# Patient Record
Sex: FEMALE | Race: AMERICAN INDIAN OR ALASKA NATIVE | NOT HISPANIC OR LATINO
[De-identification: names, ages, dates, MRNs, and addresses within clinical notes are randomized per-mention and may not be internally consistent; named-entity substitution may affect disease eponyms.]

---

## 2017-10-13 ENCOUNTER — APPOINTMENT (OUTPATIENT)
Dept: OBGYN | Facility: CLINIC | Age: 42
End: 2017-10-13
Payer: COMMERCIAL

## 2017-10-13 DIAGNOSIS — R39.15 URGENCY OF URINATION: ICD-10-CM

## 2017-10-13 LAB
BILIRUB UR QL STRIP: NEGATIVE
CLARITY UR: CLEAR
COLLECTION METHOD: NORMAL
GLUCOSE UR-MCNC: NEGATIVE
HCG UR QL: 0.2 EU/DL
HGB UR QL STRIP.AUTO: NORMAL
KETONES UR-MCNC: NEGATIVE
LEUKOCYTE ESTERASE UR QL STRIP: NEGATIVE
NITRITE UR QL STRIP: NEGATIVE
PH UR STRIP: 6.5
PROT UR STRIP-MCNC: NEGATIVE
SP GR UR STRIP: 1.01

## 2017-10-13 PROCEDURE — 99211 OFF/OP EST MAY X REQ PHY/QHP: CPT

## 2017-10-16 LAB — BACTERIA UR CULT: ABNORMAL

## 2017-12-01 ENCOUNTER — APPOINTMENT (OUTPATIENT)
Dept: OBGYN | Facility: CLINIC | Age: 42
End: 2017-12-01

## 2018-06-06 ENCOUNTER — APPOINTMENT (OUTPATIENT)
Dept: OBGYN | Facility: CLINIC | Age: 43
End: 2018-06-06
Payer: COMMERCIAL

## 2018-06-06 VITALS
HEIGHT: 64 IN | BODY MASS INDEX: 22.53 KG/M2 | HEART RATE: 78 BPM | SYSTOLIC BLOOD PRESSURE: 124 MMHG | DIASTOLIC BLOOD PRESSURE: 79 MMHG | WEIGHT: 132 LBS

## 2018-06-06 PROCEDURE — 99396 PREV VISIT EST AGE 40-64: CPT

## 2018-06-06 RX ORDER — OSELTAMIVIR PHOSPHATE 75 MG/1
75 CAPSULE ORAL
Qty: 10 | Refills: 0 | Status: DISCONTINUED | COMMUNITY
Start: 2018-01-22

## 2018-06-06 RX ORDER — METHYLPREDNISOLONE 4 MG/1
4 TABLET ORAL
Qty: 21 | Refills: 0 | Status: DISCONTINUED | COMMUNITY
Start: 2017-12-29

## 2018-06-08 ENCOUNTER — OTHER (OUTPATIENT)
Age: 43
End: 2018-06-08

## 2018-06-11 LAB
CYTOLOGY CVX/VAG DOC THIN PREP: NORMAL
HPV HIGH+LOW RISK DNA PNL CVX: NOT DETECTED

## 2018-06-20 ENCOUNTER — APPOINTMENT (OUTPATIENT)
Dept: MAMMOGRAPHY | Facility: IMAGING CENTER | Age: 43
End: 2018-06-20
Payer: COMMERCIAL

## 2018-06-20 ENCOUNTER — OUTPATIENT (OUTPATIENT)
Dept: OUTPATIENT SERVICES | Facility: HOSPITAL | Age: 43
LOS: 1 days | End: 2018-06-20
Payer: COMMERCIAL

## 2018-06-20 DIAGNOSIS — Z01.419 ENCOUNTER FOR GYNECOLOGICAL EXAMINATION (GENERAL) (ROUTINE) WITHOUT ABNORMAL FINDINGS: ICD-10-CM

## 2018-06-20 PROCEDURE — 77067 SCR MAMMO BI INCL CAD: CPT

## 2018-06-20 PROCEDURE — 77063 BREAST TOMOSYNTHESIS BI: CPT

## 2018-06-20 PROCEDURE — 77067 SCR MAMMO BI INCL CAD: CPT | Mod: 26

## 2018-06-20 PROCEDURE — 77063 BREAST TOMOSYNTHESIS BI: CPT | Mod: 26

## 2018-07-05 ENCOUNTER — APPOINTMENT (OUTPATIENT)
Dept: MAMMOGRAPHY | Facility: IMAGING CENTER | Age: 43
End: 2018-07-05
Payer: COMMERCIAL

## 2018-07-05 ENCOUNTER — OUTPATIENT (OUTPATIENT)
Dept: OUTPATIENT SERVICES | Facility: HOSPITAL | Age: 43
LOS: 1 days | End: 2018-07-05
Payer: COMMERCIAL

## 2018-07-05 ENCOUNTER — APPOINTMENT (OUTPATIENT)
Dept: ULTRASOUND IMAGING | Facility: IMAGING CENTER | Age: 43
End: 2018-07-05
Payer: COMMERCIAL

## 2018-07-05 DIAGNOSIS — R92.2 INCONCLUSIVE MAMMOGRAM: ICD-10-CM

## 2018-07-05 PROCEDURE — 76642 ULTRASOUND BREAST LIMITED: CPT

## 2018-07-05 PROCEDURE — G0279: CPT

## 2018-07-05 PROCEDURE — G0279: CPT | Mod: 26

## 2018-07-05 PROCEDURE — 77061 BREAST TOMOSYNTHESIS UNI: CPT | Mod: 26,RT

## 2018-07-05 PROCEDURE — 77065 DX MAMMO INCL CAD UNI: CPT

## 2018-07-05 PROCEDURE — 76642 ULTRASOUND BREAST LIMITED: CPT | Mod: 26,RT

## 2018-07-05 PROCEDURE — 77065 DX MAMMO INCL CAD UNI: CPT | Mod: 26

## 2018-07-05 PROCEDURE — 77065 DX MAMMO INCL CAD UNI: CPT | Mod: 26,RT

## 2018-10-04 ENCOUNTER — RESULT CHARGE (OUTPATIENT)
Age: 43
End: 2018-10-04

## 2018-10-04 ENCOUNTER — APPOINTMENT (OUTPATIENT)
Dept: OBGYN | Facility: CLINIC | Age: 43
End: 2018-10-04
Payer: COMMERCIAL

## 2018-10-04 ENCOUNTER — OTHER (OUTPATIENT)
Age: 43
End: 2018-10-04

## 2018-10-04 VITALS
SYSTOLIC BLOOD PRESSURE: 128 MMHG | HEART RATE: 75 BPM | WEIGHT: 133 LBS | DIASTOLIC BLOOD PRESSURE: 79 MMHG | BODY MASS INDEX: 22.71 KG/M2 | HEIGHT: 64 IN

## 2018-10-04 DIAGNOSIS — N92.6 IRREGULAR MENSTRUATION, UNSPECIFIED: ICD-10-CM

## 2018-10-04 DIAGNOSIS — N89.8 OTHER SPECIFIED NONINFLAMMATORY DISORDERS OF VAGINA: ICD-10-CM

## 2018-10-04 DIAGNOSIS — Z87.42 PERSONAL HISTORY OF OTHER DISEASES OF THE FEMALE GENITAL TRACT: ICD-10-CM

## 2018-10-04 PROCEDURE — 99214 OFFICE O/P EST MOD 30 MIN: CPT

## 2018-10-04 RX ORDER — CIPROFLOXACIN HYDROCHLORIDE 500 MG/1
500 TABLET, FILM COATED ORAL
Qty: 6 | Refills: 0 | Status: DISCONTINUED | COMMUNITY
Start: 2017-10-13 | End: 2018-10-04

## 2018-10-04 RX ORDER — AMOXICILLIN AND CLAVULANATE POTASSIUM 875; 125 MG/1; MG/1
875-125 TABLET, COATED ORAL
Qty: 14 | Refills: 0 | Status: DISCONTINUED | COMMUNITY
Start: 2017-12-29 | End: 2018-10-04

## 2018-10-08 ENCOUNTER — OTHER (OUTPATIENT)
Age: 43
End: 2018-10-08

## 2018-10-08 LAB
BACTERIA UR CULT: ABNORMAL
CANDIDA VAG CYTO: DETECTED
ESTRADIOL SERPL-MCNC: 75 PG/ML
FSH SERPL-MCNC: 13.6 IU/L
G VAGINALIS+PREV SP MTYP VAG QL MICRO: NOT DETECTED
HCG SERPL-MCNC: <1 MIU/ML
PROLACTIN SERPL-MCNC: 11.6 NG/ML
T VAGINALIS VAG QL WET PREP: NOT DETECTED
T4 FREE SERPL-MCNC: 1.4 NG/DL
TSH SERPL-ACNC: 1.91 UIU/ML

## 2018-10-09 ENCOUNTER — OTHER (OUTPATIENT)
Age: 43
End: 2018-10-09

## 2019-05-31 ENCOUNTER — APPOINTMENT (OUTPATIENT)
Dept: ULTRASOUND IMAGING | Facility: CLINIC | Age: 44
End: 2019-05-31
Payer: COMMERCIAL

## 2019-05-31 ENCOUNTER — OUTPATIENT (OUTPATIENT)
Dept: OUTPATIENT SERVICES | Facility: HOSPITAL | Age: 44
LOS: 1 days | End: 2019-05-31
Payer: COMMERCIAL

## 2019-05-31 DIAGNOSIS — Z00.8 ENCOUNTER FOR OTHER GENERAL EXAMINATION: ICD-10-CM

## 2019-05-31 PROCEDURE — 76770 US EXAM ABDO BACK WALL COMP: CPT

## 2019-05-31 PROCEDURE — 76770 US EXAM ABDO BACK WALL COMP: CPT | Mod: 26

## 2019-07-15 ENCOUNTER — APPOINTMENT (OUTPATIENT)
Dept: OBGYN | Facility: CLINIC | Age: 44
End: 2019-07-15
Payer: COMMERCIAL

## 2019-07-15 VITALS
HEIGHT: 64 IN | HEART RATE: 68 BPM | BODY MASS INDEX: 22.55 KG/M2 | WEIGHT: 132.1 LBS | DIASTOLIC BLOOD PRESSURE: 70 MMHG | SYSTOLIC BLOOD PRESSURE: 109 MMHG

## 2019-07-15 DIAGNOSIS — H91.90 UNSPECIFIED HEARING LOSS, UNSPECIFIED EAR: ICD-10-CM

## 2019-07-15 DIAGNOSIS — D32.0 BENIGN NEOPLASM OF CEREBRAL MENINGES: ICD-10-CM

## 2019-07-15 DIAGNOSIS — Z01.419 ENCOUNTER FOR GYNECOLOGICAL EXAMINATION (GENERAL) (ROUTINE) W/OUT ABNORMAL FINDINGS: ICD-10-CM

## 2019-07-15 PROCEDURE — 99396 PREV VISIT EST AGE 40-64: CPT

## 2019-07-15 NOTE — CHIEF COMPLAINT
[Annual Visit] : annual visit [FreeTextEntry1] : 43 y.o. P 3003  LMP 7/9/19 for annual exam. pt feels well. pt has hx of meningioma, s/p craniotomy , followed by Gamma knife radiation at [facility]. .

## 2019-07-15 NOTE — PHYSICAL EXAM
[Awake] : awake [Alert] : alert [Acute Distress] : no acute distress [Mass] : no breast mass [Nipple Discharge] : no nipple discharge [Axillary LAD] : no axillary lymphadenopathy [Soft] : soft [Tender] : non tender [Oriented x3] : oriented to person, place, and time [Normal] : uterus [No Bleeding] : there was no active vaginal bleeding [Normal Position] : in a normal position [Uterine Adnexae] : were not tender and not enlarged

## 2019-07-31 ENCOUNTER — APPOINTMENT (OUTPATIENT)
Age: 44
End: 2019-07-31

## 2019-10-17 ENCOUNTER — APPOINTMENT (OUTPATIENT)
Dept: MAMMOGRAPHY | Facility: IMAGING CENTER | Age: 44
End: 2019-10-17

## 2019-10-17 ENCOUNTER — APPOINTMENT (OUTPATIENT)
Dept: ULTRASOUND IMAGING | Facility: IMAGING CENTER | Age: 44
End: 2019-10-17

## 2019-11-20 ENCOUNTER — FORM ENCOUNTER (OUTPATIENT)
Age: 44
End: 2019-11-20

## 2019-11-21 ENCOUNTER — APPOINTMENT (OUTPATIENT)
Dept: ULTRASOUND IMAGING | Facility: IMAGING CENTER | Age: 44
End: 2019-11-21
Payer: COMMERCIAL

## 2019-11-21 ENCOUNTER — APPOINTMENT (OUTPATIENT)
Dept: MAMMOGRAPHY | Facility: IMAGING CENTER | Age: 44
End: 2019-11-21
Payer: COMMERCIAL

## 2019-11-21 ENCOUNTER — OUTPATIENT (OUTPATIENT)
Dept: OUTPATIENT SERVICES | Facility: HOSPITAL | Age: 44
LOS: 1 days | End: 2019-11-21
Payer: COMMERCIAL

## 2019-11-21 DIAGNOSIS — Z00.8 ENCOUNTER FOR OTHER GENERAL EXAMINATION: ICD-10-CM

## 2019-11-21 PROCEDURE — 77067 SCR MAMMO BI INCL CAD: CPT | Mod: 26

## 2019-11-21 PROCEDURE — 76641 ULTRASOUND BREAST COMPLETE: CPT | Mod: 26,50

## 2019-11-21 PROCEDURE — 77063 BREAST TOMOSYNTHESIS BI: CPT | Mod: 26

## 2019-11-21 PROCEDURE — 77067 SCR MAMMO BI INCL CAD: CPT

## 2019-11-21 PROCEDURE — 76641 ULTRASOUND BREAST COMPLETE: CPT

## 2019-11-21 PROCEDURE — 77063 BREAST TOMOSYNTHESIS BI: CPT

## 2020-04-25 ENCOUNTER — MESSAGE (OUTPATIENT)
Age: 45
End: 2020-04-25

## 2020-07-01 LAB
SARS-COV-2 IGG SERPL IA-ACNC: <3.8 AU/ML
SARS-COV-2 IGG SERPL QL IA: NEGATIVE

## 2020-11-05 ENCOUNTER — APPOINTMENT (OUTPATIENT)
Dept: OBGYN | Facility: CLINIC | Age: 45
End: 2020-11-05
Payer: COMMERCIAL

## 2020-11-05 VITALS
SYSTOLIC BLOOD PRESSURE: 119 MMHG | HEIGHT: 64 IN | WEIGHT: 132 LBS | BODY MASS INDEX: 22.53 KG/M2 | TEMPERATURE: 98.9 F | HEART RATE: 67 BPM | DIASTOLIC BLOOD PRESSURE: 82 MMHG

## 2020-11-05 DIAGNOSIS — R39.11 HESITANCY OF MICTURITION: ICD-10-CM

## 2020-11-05 PROBLEM — N92.6 IRREGULAR MENSTRUAL CYCLE: Status: ACTIVE | Noted: 2020-11-05

## 2020-11-05 PROCEDURE — 99072 ADDL SUPL MATRL&STAF TM PHE: CPT

## 2020-11-05 PROCEDURE — 99214 OFFICE O/P EST MOD 30 MIN: CPT

## 2020-11-05 RX ORDER — FLUCONAZOLE 150 MG/1
150 TABLET ORAL
Qty: 2 | Refills: 1 | Status: DISCONTINUED | COMMUNITY
Start: 2018-10-08 | End: 2020-11-05

## 2020-11-05 RX ORDER — AMPICILLIN 500 MG/1
500 CAPSULE ORAL EVERY 8 HOURS
Qty: 21 | Refills: 0 | Status: DISCONTINUED | COMMUNITY
Start: 2018-10-08 | End: 2020-11-05

## 2020-11-05 NOTE — PHYSICAL EXAM
[Labia Majora] : normal [Labia Minora] : normal [Moderate] : There was moderate vaginal bleeding [Normal] : normal [Tenderness] : nontender [Uterine Adnexae] : non-palpable

## 2020-11-09 ENCOUNTER — NON-APPOINTMENT (OUTPATIENT)
Age: 45
End: 2020-11-09

## 2020-11-10 ENCOUNTER — APPOINTMENT (OUTPATIENT)
Dept: OBGYN | Facility: CLINIC | Age: 45
End: 2020-11-10
Payer: COMMERCIAL

## 2020-11-10 ENCOUNTER — ASOB RESULT (OUTPATIENT)
Age: 45
End: 2020-11-10

## 2020-11-10 VITALS
HEIGHT: 64 IN | TEMPERATURE: 98.4 F | WEIGHT: 135 LBS | SYSTOLIC BLOOD PRESSURE: 111 MMHG | DIASTOLIC BLOOD PRESSURE: 76 MMHG | HEART RATE: 73 BPM | BODY MASS INDEX: 23.05 KG/M2

## 2020-11-10 DIAGNOSIS — Z87.440 PERSONAL HISTORY OF URINARY (TRACT) INFECTIONS: ICD-10-CM

## 2020-11-10 LAB
BACTERIA UR CULT: ABNORMAL
C TRACH RRNA SPEC QL NAA+PROBE: NOT DETECTED
CANDIDA VAG CYTO: NOT DETECTED
ESTRADIOL SERPL-MCNC: 40 PG/ML
FSH SERPL-MCNC: 27.1 IU/L
G VAGINALIS+PREV SP MTYP VAG QL MICRO: NOT DETECTED
HCG SERPL-MCNC: <1 MIU/ML
N GONORRHOEA RRNA SPEC QL NAA+PROBE: NOT DETECTED
PROLACTIN SERPL-MCNC: 6.2 NG/ML
SOURCE AMPLIFICATION: NORMAL
T VAGINALIS VAG QL WET PREP: NOT DETECTED
TSH SERPL-ACNC: 1.46 UIU/ML

## 2020-11-10 PROCEDURE — 99072 ADDL SUPL MATRL&STAF TM PHE: CPT

## 2020-11-10 PROCEDURE — 58100 BIOPSY OF UTERUS LINING: CPT

## 2020-11-10 PROCEDURE — 76830 TRANSVAGINAL US NON-OB: CPT

## 2020-11-10 NOTE — PROCEDURE
[Endometrial Biopsy] : Endometrial biopsy [Time out performed] : Pre-procedure time out performed.  Patient's name, date of birth and procedure confirmed. [Consent Obtained] : Consent obtained [Irregular Bleeding] : irregular uterine bleeding [Risks] : risks [Benefits] : benefits [Patient] : patient [Infection] : infection [Bleeding] : bleeding [Uterine Perforation] : uterine perforation [Negative] : negative pregnancy test [Betadine] : Betadine [None] : none [Tenaculum] : Tenaculum [Easy Passage] : Easy passage [Anteverted] : anteverted [Moderate] : moderate [Tolerated Well] : Patient tolerated the procedure well [No Complications] : No complications

## 2020-11-18 ENCOUNTER — NON-APPOINTMENT (OUTPATIENT)
Age: 45
End: 2020-11-18

## 2020-11-18 LAB — CORE LAB BIOPSY: NORMAL

## 2020-12-01 ENCOUNTER — APPOINTMENT (OUTPATIENT)
Dept: OBGYN | Facility: CLINIC | Age: 45
End: 2020-12-01
Payer: COMMERCIAL

## 2020-12-01 VITALS
HEIGHT: 64 IN | HEART RATE: 81 BPM | DIASTOLIC BLOOD PRESSURE: 88 MMHG | BODY MASS INDEX: 23.05 KG/M2 | SYSTOLIC BLOOD PRESSURE: 129 MMHG | WEIGHT: 135 LBS

## 2020-12-01 DIAGNOSIS — Z00.00 ENCOUNTER FOR GENERAL ADULT MEDICAL EXAMINATION W/OUT ABNORMAL FINDINGS: ICD-10-CM

## 2020-12-01 PROCEDURE — 99213 OFFICE O/P EST LOW 20 MIN: CPT

## 2020-12-01 PROCEDURE — 99072 ADDL SUPL MATRL&STAF TM PHE: CPT

## 2020-12-01 NOTE — DISCUSSION/SUMMARY
[FreeTextEntry1] : A - r/o female climacteric state ( oligomenorrhea)\par       s/p UTI \par \par P - referral for mammogram given\par       FSH, LH, done\par       Urine C&S done\par      pap next due in 2021\par     exercise encouraged\par      nutritional counseling  provided. \par       f/u 1 year

## 2020-12-01 NOTE — HISTORY OF PRESENT ILLNESS
[FreeTextEntry1] : pt presents for follow up. LNMP 11/2/20.  summer 2020 . pt denies VMS. pt saw Katlin Dockery for for c/o urinary sxs and AUB, in early November, pt had >10,5 K.pneumoniae . pt was treated with Macrobid x 7 days. pt had bloodwork ,drawn for evaluation of AUB along with EMB which was benign , and her FSH was 27. \par today, will repeat FSH, along with LH and repeat Urine C&S post treatment for bacteriuria . next pap is due in 2021. pt is due for mammography.

## 2020-12-02 LAB
FSH SERPL-MCNC: 6.2 IU/L
LH SERPL-ACNC: 8.1 IU/L

## 2020-12-04 ENCOUNTER — NON-APPOINTMENT (OUTPATIENT)
Age: 45
End: 2020-12-04

## 2020-12-04 LAB — BACTERIA UR CULT: ABNORMAL

## 2020-12-23 PROBLEM — Z87.440 HISTORY OF URINARY TRACT INFECTION: Status: RESOLVED | Noted: 2020-11-10 | Resolved: 2020-12-23

## 2021-01-07 ENCOUNTER — OUTPATIENT (OUTPATIENT)
Dept: OUTPATIENT SERVICES | Facility: HOSPITAL | Age: 46
LOS: 1 days | End: 2021-01-07
Payer: COMMERCIAL

## 2021-01-07 ENCOUNTER — APPOINTMENT (OUTPATIENT)
Dept: RADIOLOGY | Facility: CLINIC | Age: 46
End: 2021-01-07
Payer: COMMERCIAL

## 2021-01-07 ENCOUNTER — APPOINTMENT (OUTPATIENT)
Dept: ULTRASOUND IMAGING | Facility: CLINIC | Age: 46
End: 2021-01-07
Payer: COMMERCIAL

## 2021-01-07 DIAGNOSIS — Z00.8 ENCOUNTER FOR OTHER GENERAL EXAMINATION: ICD-10-CM

## 2021-01-07 PROCEDURE — 72110 X-RAY EXAM L-2 SPINE 4/>VWS: CPT | Mod: 26

## 2021-01-07 PROCEDURE — 76775 US EXAM ABDO BACK WALL LIM: CPT

## 2021-01-07 PROCEDURE — 72110 X-RAY EXAM L-2 SPINE 4/>VWS: CPT

## 2021-01-07 PROCEDURE — 76775 US EXAM ABDO BACK WALL LIM: CPT | Mod: 26

## 2021-02-18 ENCOUNTER — APPOINTMENT (OUTPATIENT)
Dept: ULTRASOUND IMAGING | Facility: IMAGING CENTER | Age: 46
End: 2021-02-18
Payer: COMMERCIAL

## 2021-02-18 ENCOUNTER — APPOINTMENT (OUTPATIENT)
Dept: MAMMOGRAPHY | Facility: IMAGING CENTER | Age: 46
End: 2021-02-18
Payer: COMMERCIAL

## 2021-02-18 ENCOUNTER — OUTPATIENT (OUTPATIENT)
Dept: OUTPATIENT SERVICES | Facility: HOSPITAL | Age: 46
LOS: 1 days | End: 2021-02-18
Payer: COMMERCIAL

## 2021-02-18 ENCOUNTER — RESULT REVIEW (OUTPATIENT)
Age: 46
End: 2021-02-18

## 2021-02-18 DIAGNOSIS — Z00.8 ENCOUNTER FOR OTHER GENERAL EXAMINATION: ICD-10-CM

## 2021-02-18 PROCEDURE — 77063 BREAST TOMOSYNTHESIS BI: CPT | Mod: 26

## 2021-02-18 PROCEDURE — 76641 ULTRASOUND BREAST COMPLETE: CPT | Mod: 26,50

## 2021-02-18 PROCEDURE — 77067 SCR MAMMO BI INCL CAD: CPT

## 2021-02-18 PROCEDURE — 77063 BREAST TOMOSYNTHESIS BI: CPT

## 2021-02-18 PROCEDURE — 77067 SCR MAMMO BI INCL CAD: CPT | Mod: 26

## 2021-02-18 PROCEDURE — 76641 ULTRASOUND BREAST COMPLETE: CPT

## 2021-04-19 ENCOUNTER — NON-APPOINTMENT (OUTPATIENT)
Age: 46
End: 2021-04-19

## 2021-04-20 ENCOUNTER — RESULT REVIEW (OUTPATIENT)
Age: 46
End: 2021-04-20

## 2021-04-20 ENCOUNTER — APPOINTMENT (OUTPATIENT)
Dept: OBGYN | Facility: CLINIC | Age: 46
End: 2021-04-20
Payer: COMMERCIAL

## 2021-04-20 VITALS
DIASTOLIC BLOOD PRESSURE: 93 MMHG | BODY MASS INDEX: 23.05 KG/M2 | HEIGHT: 64 IN | HEART RATE: 74 BPM | SYSTOLIC BLOOD PRESSURE: 139 MMHG | WEIGHT: 135 LBS

## 2021-04-20 DIAGNOSIS — N91.5 OLIGOMENORRHEA, UNSPECIFIED: ICD-10-CM

## 2021-04-20 DIAGNOSIS — N63.20 UNSPECIFIED LUMP IN THE LEFT BREAST, UNSPECIFIED QUADRANT: ICD-10-CM

## 2021-04-20 PROCEDURE — 99213 OFFICE O/P EST LOW 20 MIN: CPT

## 2021-04-20 PROCEDURE — 99072 ADDL SUPL MATRL&STAF TM PHE: CPT

## 2021-04-20 NOTE — DISCUSSION/SUMMARY
[FreeTextEntry1] : A- left breast cyst\par      oligomenorrhea\par \par P- f/u with Radiology for aspiration of left breast cyst\par      FSH, LH,TSH done\par     pt to call for results.

## 2021-04-20 NOTE — HISTORY OF PRESENT ILLNESS
[FreeTextEntry1] : pt presents for follow up, LMP, Feb. 2, 2021, today, is concerned over 'lump' in left breast. first noticed Sunday night, ( 2 days ago. )  pt had birads-2 breast imaging in Feb. 2021, ( mammo and sono).

## 2021-04-22 LAB
FSH SERPL-MCNC: 70.8 IU/L
LH SERPL-ACNC: 39.5 IU/L
TSH SERPL-ACNC: 2.23 UIU/ML

## 2021-04-29 ENCOUNTER — OUTPATIENT (OUTPATIENT)
Dept: OUTPATIENT SERVICES | Facility: HOSPITAL | Age: 46
LOS: 1 days | End: 2021-04-29
Payer: COMMERCIAL

## 2021-04-29 ENCOUNTER — RESULT REVIEW (OUTPATIENT)
Age: 46
End: 2021-04-29

## 2021-04-29 ENCOUNTER — APPOINTMENT (OUTPATIENT)
Dept: ULTRASOUND IMAGING | Facility: IMAGING CENTER | Age: 46
End: 2021-04-29
Payer: COMMERCIAL

## 2021-04-29 DIAGNOSIS — Z00.8 ENCOUNTER FOR OTHER GENERAL EXAMINATION: ICD-10-CM

## 2021-04-29 DIAGNOSIS — N63.20 UNSPECIFIED LUMP IN THE LEFT BREAST, UNSPECIFIED QUADRANT: ICD-10-CM

## 2021-04-29 PROCEDURE — 19000 PUNCTURE ASPIR CYST BREAST: CPT

## 2021-04-29 PROCEDURE — 88173 CYTOPATH EVAL FNA REPORT: CPT | Mod: 26

## 2021-04-29 PROCEDURE — 88173 CYTOPATH EVAL FNA REPORT: CPT

## 2021-04-29 PROCEDURE — 76942 ECHO GUIDE FOR BIOPSY: CPT | Mod: 26

## 2021-04-29 PROCEDURE — 19000 PUNCTURE ASPIR CYST BREAST: CPT | Mod: LT

## 2021-04-29 PROCEDURE — 76942 ECHO GUIDE FOR BIOPSY: CPT

## 2021-04-30 LAB — NON-GYNECOLOGICAL CYTOLOGY STUDY: SIGNIFICANT CHANGE UP

## 2021-05-14 ENCOUNTER — NON-APPOINTMENT (OUTPATIENT)
Age: 46
End: 2021-05-14

## 2021-06-07 ENCOUNTER — APPOINTMENT (OUTPATIENT)
Dept: OBGYN | Facility: CLINIC | Age: 46
End: 2021-06-07

## 2021-08-05 ENCOUNTER — APPOINTMENT (OUTPATIENT)
Dept: OBGYN | Facility: CLINIC | Age: 46
End: 2021-08-05
Payer: COMMERCIAL

## 2021-08-05 VITALS
TEMPERATURE: 96.4 F | WEIGHT: 139 LBS | HEART RATE: 76 BPM | HEIGHT: 64 IN | SYSTOLIC BLOOD PRESSURE: 128 MMHG | DIASTOLIC BLOOD PRESSURE: 86 MMHG | BODY MASS INDEX: 23.73 KG/M2

## 2021-08-05 DIAGNOSIS — N92.6 IRREGULAR MENSTRUATION, UNSPECIFIED: ICD-10-CM

## 2021-08-05 PROCEDURE — 99213 OFFICE O/P EST LOW 20 MIN: CPT

## 2021-08-06 LAB
APPEARANCE: CLEAR
BACTERIA: NEGATIVE
BILIRUBIN URINE: NEGATIVE
BLOOD URINE: ABNORMAL
COLOR: NORMAL
GLUCOSE QUALITATIVE U: NEGATIVE
HYALINE CASTS: 0 /LPF
KETONES URINE: NEGATIVE
LEUKOCYTE ESTERASE URINE: NEGATIVE
MICROSCOPIC-UA: NORMAL
NITRITE URINE: NEGATIVE
PH URINE: 7
PROTEIN URINE: NEGATIVE
RED BLOOD CELLS URINE: 14 /HPF
SPECIFIC GRAVITY URINE: 1.01
SQUAMOUS EPITHELIAL CELLS: 1 /HPF
UROBILINOGEN URINE: NORMAL
WHITE BLOOD CELLS URINE: 0 /HPF

## 2021-08-10 DIAGNOSIS — N39.0 URINARY TRACT INFECTION, SITE NOT SPECIFIED: ICD-10-CM

## 2021-08-10 LAB — BACTERIA UR CULT: ABNORMAL

## 2021-08-10 RX ORDER — CIPROFLOXACIN HYDROCHLORIDE 500 MG/1
500 TABLET, FILM COATED ORAL
Qty: 6 | Refills: 0 | Status: DISCONTINUED | COMMUNITY
Start: 2020-12-04 | End: 2021-08-10

## 2021-08-10 RX ORDER — NITROFURANTOIN (MONOHYDRATE/MACROCRYSTALS) 25; 75 MG/1; MG/1
100 CAPSULE ORAL
Qty: 14 | Refills: 0 | Status: DISCONTINUED | COMMUNITY
Start: 2020-11-10 | End: 2021-08-10

## 2021-08-16 ENCOUNTER — APPOINTMENT (OUTPATIENT)
Dept: OBGYN | Facility: CLINIC | Age: 46
End: 2021-08-16
Payer: COMMERCIAL

## 2021-08-16 ENCOUNTER — ASOB RESULT (OUTPATIENT)
Age: 46
End: 2021-08-16

## 2021-08-16 PROCEDURE — 76830 TRANSVAGINAL US NON-OB: CPT

## 2022-01-11 ENCOUNTER — OUTPATIENT (OUTPATIENT)
Dept: OUTPATIENT SERVICES | Facility: HOSPITAL | Age: 47
LOS: 1 days | End: 2022-01-11
Payer: COMMERCIAL

## 2022-01-11 ENCOUNTER — APPOINTMENT (OUTPATIENT)
Dept: RADIOLOGY | Facility: CLINIC | Age: 47
End: 2022-01-11

## 2022-01-11 DIAGNOSIS — Z00.8 ENCOUNTER FOR OTHER GENERAL EXAMINATION: ICD-10-CM

## 2022-01-11 PROCEDURE — 71046 X-RAY EXAM CHEST 2 VIEWS: CPT | Mod: 26

## 2022-01-11 PROCEDURE — 71046 X-RAY EXAM CHEST 2 VIEWS: CPT

## 2022-01-19 ENCOUNTER — APPOINTMENT (OUTPATIENT)
Dept: ULTRASOUND IMAGING | Facility: IMAGING CENTER | Age: 47
End: 2022-01-19
Payer: COMMERCIAL

## 2022-01-19 ENCOUNTER — OUTPATIENT (OUTPATIENT)
Dept: OUTPATIENT SERVICES | Facility: HOSPITAL | Age: 47
LOS: 1 days | End: 2022-01-19
Payer: COMMERCIAL

## 2022-01-19 ENCOUNTER — TRANSCRIPTION ENCOUNTER (OUTPATIENT)
Age: 47
End: 2022-01-19

## 2022-01-19 ENCOUNTER — APPOINTMENT (OUTPATIENT)
Dept: MAMMOGRAPHY | Facility: IMAGING CENTER | Age: 47
End: 2022-01-19
Payer: COMMERCIAL

## 2022-01-19 DIAGNOSIS — Z00.8 ENCOUNTER FOR OTHER GENERAL EXAMINATION: ICD-10-CM

## 2022-01-19 PROCEDURE — 76641 ULTRASOUND BREAST COMPLETE: CPT

## 2022-01-19 PROCEDURE — 76641 ULTRASOUND BREAST COMPLETE: CPT | Mod: 26,50

## 2022-01-19 PROCEDURE — G0279: CPT

## 2022-01-19 PROCEDURE — 77066 DX MAMMO INCL CAD BI: CPT | Mod: 26

## 2022-01-19 PROCEDURE — 77066 DX MAMMO INCL CAD BI: CPT

## 2022-01-19 PROCEDURE — 77062 BREAST TOMOSYNTHESIS BI: CPT | Mod: 26

## 2022-05-19 ENCOUNTER — NON-APPOINTMENT (OUTPATIENT)
Age: 47
End: 2022-05-19

## 2023-07-18 ENCOUNTER — APPOINTMENT (OUTPATIENT)
Dept: OBGYN | Facility: CLINIC | Age: 48
End: 2023-07-18
Payer: COMMERCIAL

## 2023-07-18 VITALS
SYSTOLIC BLOOD PRESSURE: 127 MMHG | HEART RATE: 75 BPM | DIASTOLIC BLOOD PRESSURE: 87 MMHG | BODY MASS INDEX: 23.9 KG/M2 | WEIGHT: 140 LBS | HEIGHT: 64 IN

## 2023-07-18 DIAGNOSIS — N95.1 MENOPAUSAL AND FEMALE CLIMACTERIC STATES: ICD-10-CM

## 2023-07-18 DIAGNOSIS — Z01.411 ENCOUNTER FOR GYNECOLOGICAL EXAMINATION (GENERAL) (ROUTINE) WITH ABNORMAL FINDINGS: ICD-10-CM

## 2023-07-18 PROCEDURE — 99396 PREV VISIT EST AGE 40-64: CPT

## 2023-07-18 RX ORDER — CIPROFLOXACIN HYDROCHLORIDE 500 MG/1
500 TABLET, FILM COATED ORAL
Qty: 10 | Refills: 0 | Status: COMPLETED | COMMUNITY
Start: 2021-08-10 | End: 2023-07-18

## 2023-07-18 NOTE — HISTORY OF PRESENT ILLNESS
[FreeTextEntry1] : 47 y.o. P 3003  LNMP Feb. 2023, presents for annual exam., pt feels well, , s/p craniotomy for a meningioma,  followed by Gamma knife Radiation at [facility]. pt is climacteric experiencing VMS. . pt is due for mammo/ breast sono , Dexa, pap and colonoscopy. \par pt has appt.with G.I. next week.

## 2023-07-18 NOTE — DISCUSSION/SUMMARY
[FreeTextEntry1] : A - WWV\par       female climacteric state \par \par P- f/u 1 year\par      pap done \par      referral for Dexa, Mammo/breast sono given\par     pt has appt. next week for G.I. for baseline colorectal screening\par      exercise encouraged\par     nutritional counseling provided,

## 2023-07-19 LAB — HPV HIGH+LOW RISK DNA PNL CVX: NOT DETECTED

## 2023-07-26 LAB — CYTOLOGY CVX/VAG DOC THIN PREP: ABNORMAL

## 2023-08-22 ENCOUNTER — APPOINTMENT (OUTPATIENT)
Dept: MAMMOGRAPHY | Facility: IMAGING CENTER | Age: 48
End: 2023-08-22
Payer: COMMERCIAL

## 2023-08-22 ENCOUNTER — OUTPATIENT (OUTPATIENT)
Dept: OUTPATIENT SERVICES | Facility: HOSPITAL | Age: 48
LOS: 1 days | End: 2023-08-22
Payer: COMMERCIAL

## 2023-08-22 ENCOUNTER — RESULT REVIEW (OUTPATIENT)
Age: 48
End: 2023-08-22

## 2023-08-22 ENCOUNTER — APPOINTMENT (OUTPATIENT)
Dept: RADIOLOGY | Facility: IMAGING CENTER | Age: 48
End: 2023-08-22
Payer: COMMERCIAL

## 2023-08-22 ENCOUNTER — APPOINTMENT (OUTPATIENT)
Dept: ULTRASOUND IMAGING | Facility: IMAGING CENTER | Age: 48
End: 2023-08-22
Payer: COMMERCIAL

## 2023-08-22 DIAGNOSIS — Z00.8 ENCOUNTER FOR OTHER GENERAL EXAMINATION: ICD-10-CM

## 2023-08-22 PROCEDURE — 76641 ULTRASOUND BREAST COMPLETE: CPT

## 2023-08-22 PROCEDURE — 77063 BREAST TOMOSYNTHESIS BI: CPT

## 2023-08-22 PROCEDURE — 77067 SCR MAMMO BI INCL CAD: CPT | Mod: 26

## 2023-08-22 PROCEDURE — 77080 DXA BONE DENSITY AXIAL: CPT | Mod: 26

## 2023-08-22 PROCEDURE — 76641 ULTRASOUND BREAST COMPLETE: CPT | Mod: 26,50

## 2023-08-22 PROCEDURE — 77063 BREAST TOMOSYNTHESIS BI: CPT | Mod: 26

## 2023-08-22 PROCEDURE — 77080 DXA BONE DENSITY AXIAL: CPT

## 2023-08-22 PROCEDURE — 77067 SCR MAMMO BI INCL CAD: CPT

## 2024-03-04 ENCOUNTER — APPOINTMENT (OUTPATIENT)
Dept: RADIOLOGY | Facility: CLINIC | Age: 49
End: 2024-03-04
Payer: COMMERCIAL

## 2024-03-04 ENCOUNTER — OUTPATIENT (OUTPATIENT)
Dept: OUTPATIENT SERVICES | Facility: HOSPITAL | Age: 49
LOS: 1 days | End: 2024-03-04
Payer: COMMERCIAL

## 2024-03-04 DIAGNOSIS — Z00.8 ENCOUNTER FOR OTHER GENERAL EXAMINATION: ICD-10-CM

## 2024-03-04 PROCEDURE — 73560 X-RAY EXAM OF KNEE 1 OR 2: CPT

## 2024-03-04 PROCEDURE — 73560 X-RAY EXAM OF KNEE 1 OR 2: CPT | Mod: 26,RT

## 2024-05-07 ENCOUNTER — NON-APPOINTMENT (OUTPATIENT)
Age: 49
End: 2024-05-07

## 2024-06-28 ENCOUNTER — APPOINTMENT (OUTPATIENT)
Dept: ORTHOPEDIC SURGERY | Facility: CLINIC | Age: 49
End: 2024-06-28

## 2024-06-28 VITALS — BODY MASS INDEX: 23.9 KG/M2 | WEIGHT: 140 LBS | HEIGHT: 64 IN

## 2024-06-28 DIAGNOSIS — M25.561 PAIN IN RIGHT KNEE: ICD-10-CM

## 2024-06-28 PROCEDURE — 99204 OFFICE O/P NEW MOD 45 MIN: CPT

## 2024-07-05 ENCOUNTER — OUTPATIENT (OUTPATIENT)
Dept: OUTPATIENT SERVICES | Facility: HOSPITAL | Age: 49
LOS: 1 days | End: 2024-07-05
Payer: COMMERCIAL

## 2024-07-05 ENCOUNTER — APPOINTMENT (OUTPATIENT)
Dept: MRI IMAGING | Facility: CLINIC | Age: 49
End: 2024-07-05
Payer: COMMERCIAL

## 2024-07-05 DIAGNOSIS — M25.561 PAIN IN RIGHT KNEE: ICD-10-CM

## 2024-07-05 DIAGNOSIS — Z00.8 ENCOUNTER FOR OTHER GENERAL EXAMINATION: ICD-10-CM

## 2024-07-05 PROCEDURE — 73721 MRI JNT OF LWR EXTRE W/O DYE: CPT

## 2024-07-05 PROCEDURE — 73721 MRI JNT OF LWR EXTRE W/O DYE: CPT | Mod: 26,RT

## 2024-07-10 ENCOUNTER — NON-APPOINTMENT (OUTPATIENT)
Age: 49
End: 2024-07-10

## 2024-07-26 DIAGNOSIS — R92.30 DENSE BREASTS, UNSPECIFIED: ICD-10-CM

## 2024-08-01 ENCOUNTER — NON-APPOINTMENT (OUTPATIENT)
Age: 49
End: 2024-08-01

## 2024-08-06 ENCOUNTER — OUTPATIENT (OUTPATIENT)
Dept: OUTPATIENT SERVICES | Facility: HOSPITAL | Age: 49
LOS: 1 days | End: 2024-08-06
Payer: COMMERCIAL

## 2024-08-06 ENCOUNTER — RESULT REVIEW (OUTPATIENT)
Age: 49
End: 2024-08-06

## 2024-08-06 ENCOUNTER — APPOINTMENT (OUTPATIENT)
Dept: MAMMOGRAPHY | Facility: IMAGING CENTER | Age: 49
End: 2024-08-06

## 2024-08-06 ENCOUNTER — APPOINTMENT (OUTPATIENT)
Dept: ULTRASOUND IMAGING | Facility: IMAGING CENTER | Age: 49
End: 2024-08-06

## 2024-08-06 DIAGNOSIS — Z00.8 ENCOUNTER FOR OTHER GENERAL EXAMINATION: ICD-10-CM

## 2024-08-06 DIAGNOSIS — R92.30 DENSE BREASTS, UNSPECIFIED: ICD-10-CM

## 2024-08-06 DIAGNOSIS — Z00.00 ENCOUNTER FOR GENERAL ADULT MEDICAL EXAMINATION WITHOUT ABNORMAL FINDINGS: ICD-10-CM

## 2024-08-06 PROCEDURE — 76641 ULTRASOUND BREAST COMPLETE: CPT | Mod: 26,50

## 2024-08-06 PROCEDURE — 77063 BREAST TOMOSYNTHESIS BI: CPT | Mod: 26

## 2024-08-06 PROCEDURE — 77067 SCR MAMMO BI INCL CAD: CPT | Mod: 26

## 2024-08-06 PROCEDURE — 77063 BREAST TOMOSYNTHESIS BI: CPT

## 2024-08-06 PROCEDURE — 77067 SCR MAMMO BI INCL CAD: CPT

## 2024-08-06 PROCEDURE — 76641 ULTRASOUND BREAST COMPLETE: CPT

## 2024-08-12 ENCOUNTER — APPOINTMENT (OUTPATIENT)
Dept: OBGYN | Facility: CLINIC | Age: 49
End: 2024-08-12
Payer: COMMERCIAL

## 2024-08-12 VITALS
HEIGHT: 64 IN | WEIGHT: 141 LBS | DIASTOLIC BLOOD PRESSURE: 97 MMHG | SYSTOLIC BLOOD PRESSURE: 137 MMHG | BODY MASS INDEX: 24.07 KG/M2 | HEART RATE: 85 BPM

## 2024-08-12 DIAGNOSIS — Z01.411 ENCOUNTER FOR GYNECOLOGICAL EXAMINATION (GENERAL) (ROUTINE) WITH ABNORMAL FINDINGS: ICD-10-CM

## 2024-08-12 DIAGNOSIS — N95.1 MENOPAUSAL AND FEMALE CLIMACTERIC STATES: ICD-10-CM

## 2024-08-12 DIAGNOSIS — R55 SYNCOPE AND COLLAPSE: ICD-10-CM

## 2024-08-12 PROCEDURE — 99214 OFFICE O/P EST MOD 30 MIN: CPT | Mod: 25

## 2024-08-12 PROCEDURE — 99459 PELVIC EXAMINATION: CPT

## 2024-08-12 PROCEDURE — 99396 PREV VISIT EST AGE 40-64: CPT

## 2024-08-12 RX ORDER — FEZOLINETANT 45 MG/1
45 TABLET, FILM COATED ORAL
Qty: 4 | Refills: 6 | Status: ACTIVE | COMMUNITY
Start: 2024-08-12 | End: 1900-01-01

## 2024-08-12 NOTE — DISCUSSION/SUMMARY
[FreeTextEntry1] : A - WwV       severe vasomotor instability     hx of meningioma, s/p resection and radiation    Osteopenia.      decreased hearing and congestion  p- f/u 1 year     info on [employer] and [employer] given     sample of [place] and coupon given     pap next due in 2026    Dexa next due in 2025    referral for baseline colonoscopy given     mammo done last week Birads-1.      exercise encouraged,      nutritional counseling provided.      will see if recommended intervention for VMS, improves hearing acuity and congestion , if not, then pt to follow up with previously seen ENT or Neurologist

## 2024-08-12 NOTE — HISTORY OF PRESENT ILLNESS
[FreeTextEntry1] : 48 y.o. P 3003  LNMP, 2024, presents for annual exam,.  PMH-  meningioma  PSHx- Craniotomy for meningioma, followed by Gamma Knife radiation at [facility]. FH- Diabetes- parents, HTN- parents. SH- negative, GYN hx - female climacteric state with VMS.  OB hx -  x 3  ROS- pt c/o severe VMS.   pt also c/o decreased hearing ( previously seen by ENT and Neurology in Oct. 2023).  Last Dexa Aug. 2023- Osteopenia Last mammo/breast sono Aug, 2024- Birads-1 Last pap 2023- negative  pt has never had colonoscopy,

## 2024-08-12 NOTE — PHYSICAL EXAM
[Chaperone Present] : A chaperone was present in the examining room during all aspects of the physical examination [[id number]] : A chaperone was present during the pelvic exam. [Labia Majora] : normal [Labia Minora] : normal [Atrophy] : atrophy [Cystocele] : a cystocele [Normal] : normal [Anteversion] : anteverted [Uterine Adnexae] : normal [FreeTextEntry2] : Tanya Mensah,

## 2024-09-22 ENCOUNTER — OUTPATIENT (OUTPATIENT)
Dept: OUTPATIENT SERVICES | Facility: HOSPITAL | Age: 49
LOS: 1 days | End: 2024-09-22
Payer: COMMERCIAL

## 2024-09-22 DIAGNOSIS — Z00.8 ENCOUNTER FOR OTHER GENERAL EXAMINATION: ICD-10-CM

## 2024-09-22 PROCEDURE — 76700 US EXAM ABDOM COMPLETE: CPT

## 2025-03-08 ENCOUNTER — APPOINTMENT (OUTPATIENT)
Dept: RADIOLOGY | Facility: IMAGING CENTER | Age: 50
End: 2025-03-08
Payer: COMMERCIAL

## 2025-03-08 ENCOUNTER — OUTPATIENT (OUTPATIENT)
Dept: OUTPATIENT SERVICES | Facility: HOSPITAL | Age: 50
LOS: 1 days | End: 2025-03-08
Payer: COMMERCIAL

## 2025-03-08 DIAGNOSIS — Z00.8 ENCOUNTER FOR OTHER GENERAL EXAMINATION: ICD-10-CM

## 2025-03-08 PROCEDURE — 71046 X-RAY EXAM CHEST 2 VIEWS: CPT

## 2025-03-08 PROCEDURE — 71046 X-RAY EXAM CHEST 2 VIEWS: CPT | Mod: 26

## 2025-03-28 ENCOUNTER — APPOINTMENT (OUTPATIENT)
Dept: ULTRASOUND IMAGING | Facility: IMAGING CENTER | Age: 50
End: 2025-03-28
Payer: COMMERCIAL

## 2025-03-28 ENCOUNTER — OUTPATIENT (OUTPATIENT)
Dept: OUTPATIENT SERVICES | Facility: HOSPITAL | Age: 50
LOS: 1 days | End: 2025-03-28
Payer: COMMERCIAL

## 2025-03-28 DIAGNOSIS — Z00.8 ENCOUNTER FOR OTHER GENERAL EXAMINATION: ICD-10-CM

## 2025-03-28 PROCEDURE — 76642 ULTRASOUND BREAST LIMITED: CPT

## 2025-03-28 PROCEDURE — 76642 ULTRASOUND BREAST LIMITED: CPT | Mod: 26,LT

## 2025-08-13 ENCOUNTER — NON-APPOINTMENT (OUTPATIENT)
Age: 50
End: 2025-08-13